# Patient Record
Sex: FEMALE | Race: WHITE | NOT HISPANIC OR LATINO | URBAN - METROPOLITAN AREA
[De-identification: names, ages, dates, MRNs, and addresses within clinical notes are randomized per-mention and may not be internally consistent; named-entity substitution may affect disease eponyms.]

---

## 2017-05-23 ENCOUNTER — IMPORTED ENCOUNTER (OUTPATIENT)
Dept: URBAN - METROPOLITAN AREA CLINIC 43 | Facility: CLINIC | Age: 72
End: 2017-05-23

## 2017-05-23 PROBLEM — H25.13: Noted: 2017-05-23

## 2017-05-23 PROBLEM — H35.363: Noted: 2017-05-23

## 2017-05-23 PROBLEM — H43.813: Noted: 2017-05-23

## 2018-05-22 ENCOUNTER — IMPORTED ENCOUNTER (OUTPATIENT)
Dept: URBAN - METROPOLITAN AREA CLINIC 43 | Facility: CLINIC | Age: 73
End: 2018-05-22

## 2018-05-22 PROBLEM — H35.373: Noted: 2018-05-22

## 2018-05-22 PROBLEM — H35.363: Noted: 2018-05-22

## 2018-05-22 PROBLEM — H25.013: Noted: 2018-05-22

## 2018-05-22 PROBLEM — H40.013: Noted: 2018-05-22

## 2019-04-01 ENCOUNTER — IMPORTED ENCOUNTER (OUTPATIENT)
Dept: URBAN - METROPOLITAN AREA CLINIC 43 | Facility: CLINIC | Age: 74
End: 2019-04-01

## 2019-04-22 ENCOUNTER — IMPORTED ENCOUNTER (OUTPATIENT)
Dept: URBAN - METROPOLITAN AREA CLINIC 43 | Facility: CLINIC | Age: 74
End: 2019-04-22

## 2019-04-22 PROBLEM — H02.831: Noted: 2019-04-22

## 2019-04-22 PROBLEM — H25.12: Noted: 2019-04-22

## 2019-04-22 PROBLEM — H35.363: Noted: 2019-04-22

## 2019-04-22 PROBLEM — H43.813: Noted: 2019-04-22

## 2019-04-22 PROBLEM — H25.013: Noted: 2019-04-22

## 2019-04-22 PROBLEM — H02.834: Noted: 2019-04-22

## 2019-04-22 PROBLEM — H16.223: Noted: 2019-04-22

## 2019-08-23 ENCOUNTER — IMPORTED ENCOUNTER (OUTPATIENT)
Dept: URBAN - METROPOLITAN AREA CLINIC 43 | Facility: CLINIC | Age: 74
End: 2019-08-23

## 2019-10-14 ENCOUNTER — FORM ENCOUNTER (OUTPATIENT)
Age: 74
End: 2019-10-14

## 2020-04-19 ASSESSMENT — TONOMETRY
OD_IOP_MMHG: 16.0
OS_IOP_MMHG: 18.0
OS_IOP_MMHG: 18.0
OD_IOP_MMHG: 16.0
OD_IOP_MMHG: 17.0
OS_IOP_MMHG: 18.0

## 2020-04-19 ASSESSMENT — VISUAL ACUITY
OD_CC: 20/25
OD_CC: 20/25-1
OS_SC: J10-
OD_OTHER: 20/400.
OD_SC: J10
OS_SC: 20/40
OD_CC: J1
OS_SC: 20/50-
OS_OTHER: <20/400.
OD_CC: J1+/-2
OS_SC: J7
OD_SC: 20/50
OS_CC: J1
OD_CC: 20/20
OD_SC: 20/50+
OS_CC: 20/25+2
OS_CC: J1+/-2
OS_SC: 20/40+1
OD_SC: 20/50-2
OD_CC: 20/20-2
OS_CC: J1+
OD_CC: J1+
OS_OTHER: 20/400.
OD_OTHER: <20/400.
OD_SC: J10+
OS_CC: 20/20-2
OS_OTHER: 20/100.
OS_CC: 20/20 -2
OS_CC: 20/25
OD_OTHER: 20/50.

## 2020-04-19 ASSESSMENT — KERATOMETRY
OD_K2POWER_DIOPTERS: 43
OS_AXISANGLE2_DEGREES: 55
OS_K1POWER_DIOPTERS: 42.75
OD_AXISANGLE2_DEGREES: 78
OS_AXISANGLE_DEGREES: 145
OD_AXISANGLE_DEGREES: 168
OS_K2POWER_DIOPTERS: 43.25
OD_K1POWER_DIOPTERS: 43.25

## 2020-12-08 ENCOUNTER — FORM ENCOUNTER (OUTPATIENT)
Age: 75
End: 2020-12-08

## 2020-12-10 ENCOUNTER — FORM ENCOUNTER (OUTPATIENT)
Age: 75
End: 2020-12-10

## 2020-12-11 ENCOUNTER — HOSPITAL ENCOUNTER (OUTPATIENT)
Dept: HOSPITAL 74 - FRADUS-SUR | Age: 75
Discharge: HOME | End: 2020-12-11
Attending: SURGERY
Payer: COMMERCIAL

## 2020-12-11 DIAGNOSIS — N64.89: ICD-10-CM

## 2020-12-11 DIAGNOSIS — N63.14: ICD-10-CM

## 2020-12-11 DIAGNOSIS — N60.31: ICD-10-CM

## 2020-12-11 DIAGNOSIS — D24.1: Primary | ICD-10-CM

## 2020-12-11 PROCEDURE — A4648 IMPLANTABLE TISSUE MARKER: HCPCS

## 2020-12-11 PROCEDURE — 0HBT3ZX EXCISION OF RIGHT BREAST, PERCUTANEOUS APPROACH, DIAGNOSTIC: ICD-10-PCS | Performed by: SURGERY

## 2021-06-02 DIAGNOSIS — Z86.79 PERSONAL HISTORY OF OTHER DISEASES OF THE CIRCULATORY SYSTEM: ICD-10-CM

## 2021-06-02 DIAGNOSIS — Z72.89 OTHER PROBLEMS RELATED TO LIFESTYLE: ICD-10-CM

## 2021-06-02 DIAGNOSIS — Z78.9 OTHER SPECIFIED HEALTH STATUS: ICD-10-CM

## 2021-06-02 DIAGNOSIS — Z86.39 PERSONAL HISTORY OF OTHER ENDOCRINE, NUTRITIONAL AND METABOLIC DISEASE: ICD-10-CM

## 2021-06-02 DIAGNOSIS — Z80.0 FAMILY HISTORY OF MALIGNANT NEOPLASM OF DIGESTIVE ORGANS: ICD-10-CM

## 2021-06-02 DIAGNOSIS — Z87.2 PERSONAL HISTORY OF DISEASES OF THE SKIN AND SUBCUTANEOUS TISSUE: ICD-10-CM

## 2021-06-02 PROBLEM — Z00.00 ENCOUNTER FOR PREVENTIVE HEALTH EXAMINATION: Status: ACTIVE | Noted: 2021-06-02

## 2021-06-02 RX ORDER — LISINOPRIL 30 MG/1
TABLET ORAL
Refills: 0 | Status: ACTIVE | COMMUNITY

## 2021-06-02 RX ORDER — TRIAMTERENE AND HYDROCHLOROTHIAZIDE 37.5; 25 MG/1; MG/1
CAPSULE ORAL
Refills: 0 | Status: ACTIVE | COMMUNITY

## 2021-06-02 RX ORDER — ROSUVASTATIN CALCIUM 5 MG/1
TABLET, FILM COATED ORAL
Refills: 0 | Status: ACTIVE | COMMUNITY

## 2021-06-07 ENCOUNTER — APPOINTMENT (OUTPATIENT)
Dept: BREAST CENTER | Facility: CLINIC | Age: 76
End: 2021-06-07
Payer: MEDICARE

## 2021-06-07 VITALS
HEART RATE: 101 BPM | DIASTOLIC BLOOD PRESSURE: 81 MMHG | WEIGHT: 230 LBS | HEIGHT: 66.5 IN | SYSTOLIC BLOOD PRESSURE: 126 MMHG | BODY MASS INDEX: 36.53 KG/M2

## 2021-06-07 DIAGNOSIS — Z80.7 FAMILY HISTORY OF OTHER MALIGNANT NEOPLASMS OF LYMPHOID, HEMATOPOIETIC AND RELATED TISSUES: ICD-10-CM

## 2021-06-07 PROCEDURE — 99213 OFFICE O/P EST LOW 20 MIN: CPT

## 2021-06-07 RX ORDER — SULFAMETHOXAZOLE AND TRIMETHOPRIM 800; 160 MG/1; MG/1
800-160 TABLET ORAL
Qty: 10 | Refills: 0 | Status: DISCONTINUED | COMMUNITY
Start: 2021-05-29

## 2021-06-07 NOTE — PAST MEDICAL HISTORY
[Postmenopausal] : The patient is postmenopausal [Menopause Age____] : age at menopause was [unfilled] [Total Preg ___] : G[unfilled] [Live Births ___] : P[unfilled]  [Menarche Age ____] : age at menarche was [unfilled] [Age At Live Birth ___] : Age at live birth: [unfilled] [History of Hormone Replacement Treatment] : has no history of hormone replacement treatment

## 2021-06-07 NOTE — REASON FOR VISIT
[Follow-Up: _____] : a [unfilled] follow-up visit [FreeTextEntry1] : The patient comes in for follow-up with a history of fibrocystic mastopathy and did undergo a right breast 1:00 core biopsy in December 2020 just showing an intraductal papilloma and stromal fibrosis.

## 2021-06-07 NOTE — HISTORY OF PRESENT ILLNESS
[FreeTextEntry1] : The patient is a 75-year-old G3, P3 postmenopausal white female.  She underwent menopause in 2000 and never took any hormone replacement therapy.  She has no family history of breast cancer.  Her father had liver cancer.  She has a history of fibrocystic mastopathy with some thickening in the upper outer aspect of the left breast.  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  She comes in for routine follow-up.

## 2021-06-07 NOTE — PHYSICAL EXAM
[Normocephalic] : normocephalic [Atraumatic] : atraumatic [EOMI] : extra ocular movement intact [Supple] : supple [No Supraclavicular Adenopathy] : no supraclavicular adenopathy [No Cervical Adenopathy] : no cervical adenopathy [Examined in the supine and seated position] : examined in the supine and seated position [No dominant masses] : no dominant masses in right breast  [No dominant masses] : no dominant masses left breast [No Nipple Retraction] : no left nipple retraction [No Nipple Discharge] : no left nipple discharge [Breast Mass Right Breast ___cm] : no masses [Breast Mass Left Breast ___cm] : no masses [Breast Nipple Inversion] : nipples not inverted [Breast Nipple Retraction] : nipples not retracted [Breast Nipple Flattening] : nipples not flattened [Breast Nipple Fissures] : nipples not fissured [Breast Abnormal Lactation (Galactorrhea)] : no galactorrhea [Breast Abnormal Secretion Bloody Fluid] : no bloody discharge [Breast Abnormal Secretion Serous Fluid] : no serous discharge [Breast Abnormal Secretion Opalescent Fluid] : no milky discharge [No Axillary Lymphadenopathy] : no left axillary lymphadenopathy [No Edema] : no edema [No Rashes] : no rashes [No Ulceration] : no ulceration [de-identified] : On exam, the patient has ptotic D-cup breasts.  I cannot feel any suspicious densities in either breast.  She has no axillary, supraclavicular, or cervical adenopathy.

## 2021-06-07 NOTE — ASSESSMENT
[FreeTextEntry1] : The patient is a 75-year-old G3, P3 postmenopausal white female.  She underwent menopause in 2000 and never took any hormone replacement therapy.  She has no family history of breast cancer.  Her father had liver cancer.  She has a history of fibrocystic mastopathy with some thickening in the upper outer aspect of the left breast.  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  Follow-up diagnostic right breast mammography and ultrasound on [place] 3, 2021 showed postbiopsy changes in the 1 o'clock position.  On exam today I cannot feel any suspicious densities in either breast.  The patient was reassured and should follow-up again in 1 year and her next bilateral mammography and ultrasound will be due in December 2021.

## 2021-12-06 ENCOUNTER — APPOINTMENT (OUTPATIENT)
Dept: BREAST CENTER | Facility: CLINIC | Age: 76
End: 2021-12-06
Payer: MEDICARE

## 2021-12-06 VITALS — HEIGHT: 66 IN | WEIGHT: 230 LBS | BODY MASS INDEX: 36.96 KG/M2

## 2021-12-06 PROCEDURE — 99213 OFFICE O/P EST LOW 20 MIN: CPT

## 2021-12-06 NOTE — PHYSICAL EXAM
[Normocephalic] : normocephalic [Atraumatic] : atraumatic [EOMI] : extra ocular movement intact [Supple] : supple [No Supraclavicular Adenopathy] : no supraclavicular adenopathy [No Cervical Adenopathy] : no cervical adenopathy [Examined in the supine and seated position] : examined in the supine and seated position [No dominant masses] : no dominant masses in right breast  [No dominant masses] : no dominant masses left breast [No Nipple Retraction] : no left nipple retraction [No Nipple Discharge] : no left nipple discharge [Breast Mass Right Breast ___cm] : no masses [Breast Mass Left Breast ___cm] : no masses [No Axillary Lymphadenopathy] : no left axillary lymphadenopathy [No Edema] : no edema [No Rashes] : no rashes [No Ulceration] : no ulceration [Breast Nipple Inversion] : nipples not inverted [Breast Nipple Retraction] : nipples not retracted [Breast Nipple Flattening] : nipples not flattened [Breast Nipple Fissures] : nipples not fissured [Breast Abnormal Lactation (Galactorrhea)] : no galactorrhea [Breast Abnormal Secretion Bloody Fluid] : no bloody discharge [Breast Abnormal Secretion Serous Fluid] : no serous discharge [Breast Abnormal Secretion Opalescent Fluid] : no milky discharge [de-identified] : On exam, the patient has ptotic D-cup breasts.  I cannot feel any suspicious densities in either breast.  She has no axillary, supraclavicular, or cervical adenopathy.

## 2021-12-06 NOTE — HISTORY OF PRESENT ILLNESS
[FreeTextEntry1] : The patient is a 76-year-old G3, P3 postmenopausal white female.  She underwent menopause in 2000 and never took any hormone replacement therapy.  She has no family history of breast cancer.  Her father had liver cancer.  She has a history of fibrocystic mastopathy with some thickening in the upper outer aspect of the left breast.  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  She comes in for routine follow-up and continues to get yearly mammography and ultrasound.

## 2021-12-06 NOTE — ASSESSMENT
[FreeTextEntry1] : The patient is a 76-year-old G3, P3 postmenopausal white female.  She underwent menopause in 2000 and never took any hormone replacement therapy.  She has no family history of breast cancer.  Her father had liver cancer.  She has a history of fibrocystic mastopathy with some thickening in the upper outer aspect of the left breast.  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  Follow-up diagnostic right breast mammography and ultrasound on [place] 3, 2021 showed postbiopsy changes in the 1 o'clock position.  She underwent her routine bilateral mammography and ultrasound at [place] on December 2, 2021 which I reviewed and showed no suspicious findings.  On exam today, I cannot feel any suspicious densities in either breast.  The patient was reassured and should follow-up again in 1 year and her next bilateral mammography and ultrasound will be due at that time in December 2022 and she was given prescriptions.

## 2021-12-06 NOTE — REASON FOR VISIT
[Follow-Up: _____] : a [unfilled] follow-up visit [FreeTextEntry1] : The patient comes in for follow-up with a history of fibrocystic mastopathy and did undergo a right breast 1:00 core biopsy in December 2020 just showing an intraductal papilloma and stromal fibrosis.  She comes in for routine yearly exam and gets yearly mammography and ultrasound.

## 2022-01-04 ENCOUNTER — RESULT REVIEW (OUTPATIENT)
Age: 77
End: 2022-01-04

## 2022-12-05 ENCOUNTER — APPOINTMENT (OUTPATIENT)
Dept: BREAST CENTER | Facility: CLINIC | Age: 77
End: 2022-12-05

## 2022-12-05 VITALS — WEIGHT: 228 LBS | BODY MASS INDEX: 36.64 KG/M2 | HEIGHT: 66 IN

## 2022-12-05 PROCEDURE — 99213 OFFICE O/P EST LOW 20 MIN: CPT

## 2022-12-05 NOTE — ASSESSMENT
[FreeTextEntry1] : The patient is a 77-year-old G3, P3 postmenopausal white female.  She underwent menopause in 2000 and never took any hormone replacement therapy.  She has no family history of breast cancer.  Her father had liver cancer.  She has a history of fibrocystic mastopathy with some thickening in the upper outer aspect of the left breast.  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  Follow-up diagnostic right breast mammography and ultrasound on [place] 3, 2021 showed postbiopsy changes in the 1 o'clock position.  She underwent her last routine bilateral mammography and ultrasound was reviewed from today on December 5, 2022 and performed at [facility] which I reviewed on CD-ROM and showed no suspicious findings but these have not been officially read by the radiologist.  On exam today, I cannot feel any suspicious densities in either breast.  The patient was reassured and should follow-up again in 1 year and her next bilateral mammography and ultrasound will be due at that time in December 2023 and she was given prescriptions.

## 2022-12-05 NOTE — PHYSICAL EXAM
[Normocephalic] : normocephalic [Atraumatic] : atraumatic [EOMI] : extra ocular movement intact [Supple] : supple [No Supraclavicular Adenopathy] : no supraclavicular adenopathy [No Cervical Adenopathy] : no cervical adenopathy [Examined in the supine and seated position] : examined in the supine and seated position [No dominant masses] : no dominant masses in right breast  [No dominant masses] : no dominant masses left breast [No Nipple Retraction] : no left nipple retraction [No Nipple Discharge] : no left nipple discharge [Breast Mass Right Breast ___cm] : no masses [Breast Mass Left Breast ___cm] : no masses [No Axillary Lymphadenopathy] : no left axillary lymphadenopathy [No Edema] : no edema [No Rashes] : no rashes [No Ulceration] : no ulceration [Breast Nipple Inversion] : nipples not inverted [Breast Nipple Retraction] : nipples not retracted [Breast Nipple Flattening] : nipples not flattened [Breast Nipple Fissures] : nipples not fissured [Breast Abnormal Lactation (Galactorrhea)] : no galactorrhea [Breast Abnormal Secretion Bloody Fluid] : no bloody discharge [Breast Abnormal Secretion Serous Fluid] : no serous discharge [Breast Abnormal Secretion Opalescent Fluid] : no milky discharge [de-identified] : On exam, the patient has ptotic D-cup breasts.  I cannot feel any suspicious densities in either breast.  She has no axillary, supraclavicular, or cervical adenopathy.

## 2022-12-05 NOTE — HISTORY OF PRESENT ILLNESS
[FreeTextEntry1] : The patient is a 77-year-old G3, P3 postmenopausal white female.  She underwent menopause in 2000 and never took any hormone replacement therapy.  She has no family history of breast cancer.  Her father had liver cancer.  She has a history of fibrocystic mastopathy with some thickening in the upper outer aspect of the left breast.  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  She comes in for routine follow-up and continues to get yearly mammography and ultrasound.

## 2023-11-22 ENCOUNTER — NON-APPOINTMENT (OUTPATIENT)
Age: 78
End: 2023-11-22

## 2023-12-06 ENCOUNTER — APPOINTMENT (OUTPATIENT)
Dept: BREAST CENTER | Facility: CLINIC | Age: 78
End: 2023-12-06
Payer: MEDICARE

## 2023-12-06 ENCOUNTER — RESULT REVIEW (OUTPATIENT)
Age: 78
End: 2023-12-06

## 2023-12-06 VITALS
BODY MASS INDEX: 36.16 KG/M2 | WEIGHT: 225 LBS | OXYGEN SATURATION: 99 % | DIASTOLIC BLOOD PRESSURE: 75 MMHG | HEART RATE: 85 BPM | SYSTOLIC BLOOD PRESSURE: 123 MMHG | HEIGHT: 66 IN

## 2023-12-06 DIAGNOSIS — N60.11 DIFFUSE CYSTIC MASTOPATHY OF LEFT BREAST: ICD-10-CM

## 2023-12-06 DIAGNOSIS — D24.1 BENIGN NEOPLASM OF RIGHT BREAST: ICD-10-CM

## 2023-12-06 DIAGNOSIS — N60.12 DIFFUSE CYSTIC MASTOPATHY OF LEFT BREAST: ICD-10-CM

## 2023-12-06 DIAGNOSIS — Z12.39 ENCOUNTER FOR OTHER SCREENING FOR MALIGNANT NEOPLASM OF BREAST: ICD-10-CM

## 2023-12-06 PROCEDURE — 99212 OFFICE O/P EST SF 10 MIN: CPT

## 2024-11-20 ENCOUNTER — NON-APPOINTMENT (OUTPATIENT)
Age: 79
End: 2024-11-20

## 2024-12-13 ENCOUNTER — RESULT REVIEW (OUTPATIENT)
Age: 79
End: 2024-12-13

## 2024-12-13 ENCOUNTER — APPOINTMENT (OUTPATIENT)
Dept: BREAST CENTER | Facility: CLINIC | Age: 79
End: 2024-12-13
Payer: MEDICARE

## 2024-12-13 DIAGNOSIS — R92.323 MAMMOGRAPHIC FIBROGLANDULAR DENSITY, BILATERAL BREASTS: ICD-10-CM

## 2024-12-13 DIAGNOSIS — N60.12 DIFFUSE CYSTIC MASTOPATHY OF LEFT BREAST: ICD-10-CM

## 2024-12-13 DIAGNOSIS — Z12.31 ENCOUNTER FOR SCREENING MAMMOGRAM FOR MALIGNANT NEOPLASM OF BREAST: ICD-10-CM

## 2024-12-13 DIAGNOSIS — N60.11 DIFFUSE CYSTIC MASTOPATHY OF LEFT BREAST: ICD-10-CM

## 2024-12-13 PROCEDURE — 99213 OFFICE O/P EST LOW 20 MIN: CPT
